# Patient Record
Sex: MALE | Race: WHITE | NOT HISPANIC OR LATINO | Employment: FULL TIME | URBAN - METROPOLITAN AREA
[De-identification: names, ages, dates, MRNs, and addresses within clinical notes are randomized per-mention and may not be internally consistent; named-entity substitution may affect disease eponyms.]

---

## 2020-01-31 ENCOUNTER — OFFICE VISIT (OUTPATIENT)
Dept: FAMILY MEDICINE CLINIC | Facility: CLINIC | Age: 45
End: 2020-01-31

## 2020-01-31 VITALS
TEMPERATURE: 98 F | SYSTOLIC BLOOD PRESSURE: 118 MMHG | HEIGHT: 75 IN | HEART RATE: 54 BPM | OXYGEN SATURATION: 98 % | BODY MASS INDEX: 24.17 KG/M2 | WEIGHT: 194.4 LBS | DIASTOLIC BLOOD PRESSURE: 80 MMHG

## 2020-01-31 DIAGNOSIS — Z00.00 HEALTHCARE MAINTENANCE: Primary | ICD-10-CM

## 2020-01-31 PROCEDURE — 99396 PREV VISIT EST AGE 40-64: CPT | Performed by: FAMILY MEDICINE

## 2020-01-31 NOTE — PROGRESS NOTES
"Stefano Stephens is here today for an annual physical exam.     Eating a healthy diet. Exercising routinely.    Sexual and gender orientation: heterosexual male  Practicing safe sex?:yes    PHQ-2 Depression Screening  Little interest or pleasure in doing things? 0   Feeling down, depressed, or hopeless? 0   PHQ-2 Total Score 0        I have reviewed the patient's medical, family, and social history in detail and updated the computerized patient record.    Screening history:  Colonoscopy - not yet due  Prostate -no symptoms  Metabolic - due    Health Maintenance   Topic Date Due   • ANNUAL PHYSICAL  06/12/1978   • INFLUENZA VACCINE  08/01/2019   • TDAP/TD VACCINES (2 - Td) 09/21/2025       Review of Systems   Constitutional: Negative for fever.   HENT: Negative for hearing loss.    Eyes: Negative for visual disturbance.   Respiratory: Negative for shortness of breath.    Cardiovascular: Negative for chest pain.   Gastrointestinal: Negative for constipation and diarrhea.   Endocrine: Negative for polyuria.   Genitourinary: Negative for difficulty urinating.   Musculoskeletal: Negative for arthralgias and myalgias.   Skin: Negative for rash.   Neurological: Negative for headaches.   Hematological: Does not bruise/bleed easily.   Psychiatric/Behavioral: Negative for dysphoric mood.       /80   Pulse 54   Temp 98 °F (36.7 °C) (Temporal)   Ht 190.5 cm (75\")   Wt 88.2 kg (194 lb 6.4 oz)   SpO2 98%   BMI 24.30 kg/m²      Physical Exam    Vital signs reviewed.  General appearance: No acute distress  Eyes: conjunctiva clear without erythema; pupils equally round and reactive  ENT: external ears and nose normal; hearing normal, oropharynx clear  Neck: supple; no thyromegaly  CV: normal rate and rhythm; no peripheral edema  Respiratory: normal respiratory effort; lungs clear to auscultation bilaterally  MSK: normal gait and station; no focal joint deformity or swelling  Skin: no rash or wounds; normal turgor  Neuro: " cranial nerves 2-12 grossly intact; normal sensation to light touch  Psych: mood and affect normal; recent and remote memory intact    No visits with results within 2 Week(s) from this visit.   Latest known visit with results is:   No results found for any previous visit.        No current outpatient medications on file.    Stefano was seen today for annual exam.    Diagnoses and all orders for this visit:    Healthcare maintenance  -     Lipid Panel  -     Comprehensive Metabolic Panel        Encourage healthy diet and exercise.  Encourage patient to stay up to date on screening examinations as indicated based on age and risk factors. F/U yearly.

## 2020-02-01 LAB
ALBUMIN SERPL-MCNC: 4.6 G/DL (ref 3.5–5.2)
ALBUMIN/GLOB SERPL: 2.6 G/DL
ALP SERPL-CCNC: 75 U/L (ref 39–117)
ALT SERPL-CCNC: 25 U/L (ref 1–41)
AST SERPL-CCNC: 22 U/L (ref 1–40)
BILIRUB SERPL-MCNC: 0.8 MG/DL (ref 0.2–1.2)
BUN SERPL-MCNC: 13 MG/DL (ref 6–20)
BUN/CREAT SERPL: 12.5 (ref 7–25)
CALCIUM SERPL-MCNC: 9.4 MG/DL (ref 8.6–10.5)
CHLORIDE SERPL-SCNC: 101 MMOL/L (ref 98–107)
CHOLEST SERPL-MCNC: 137 MG/DL (ref 0–200)
CO2 SERPL-SCNC: 30.4 MMOL/L (ref 22–29)
CREAT SERPL-MCNC: 1.04 MG/DL (ref 0.76–1.27)
GLOBULIN SER CALC-MCNC: 1.8 GM/DL
GLUCOSE SERPL-MCNC: 90 MG/DL (ref 65–99)
HDLC SERPL-MCNC: 56 MG/DL (ref 40–60)
LDLC SERPL CALC-MCNC: 63 MG/DL (ref 0–100)
POTASSIUM SERPL-SCNC: 4.5 MMOL/L (ref 3.5–5.2)
PROT SERPL-MCNC: 6.4 G/DL (ref 6–8.5)
SODIUM SERPL-SCNC: 141 MMOL/L (ref 136–145)
TRIGL SERPL-MCNC: 91 MG/DL (ref 0–150)
VLDLC SERPL CALC-MCNC: 18.2 MG/DL

## 2020-11-02 ENCOUNTER — TELEPHONE (OUTPATIENT)
Dept: FAMILY MEDICINE CLINIC | Facility: CLINIC | Age: 45
End: 2020-11-02

## 2020-11-02 NOTE — TELEPHONE ENCOUNTER
Left message to return call.  His physician results form is ready to fax, but he didn't sign it.  I have it at my desk.

## 2021-02-08 ENCOUNTER — TELEPHONE (OUTPATIENT)
Dept: FAMILY MEDICINE CLINIC | Facility: CLINIC | Age: 46
End: 2021-02-08

## 2021-02-08 NOTE — TELEPHONE ENCOUNTER
Caller: Stefano Stephens    Relationship: Self    Best call back number: [phone]    What orders are you requesting (i.e. lab or imaging): Referral for Elbow    In what timeframe would the patient need to come in: Soon    Where will you receive your lab/imaging services: No preference    Additional notes: Patient called in wanting a referral for his elbow pain. Please call patient and advise.

## 2021-02-09 DIAGNOSIS — M25.529 ARTHRALGIA OF ELBOW, UNSPECIFIED LATERALITY: Primary | ICD-10-CM

## 2021-02-15 ENCOUNTER — OFFICE VISIT (OUTPATIENT)
Dept: ORTHOPEDIC SURGERY | Facility: CLINIC | Age: 46
End: 2021-02-15

## 2021-02-15 VITALS — WEIGHT: 185 LBS | TEMPERATURE: 97.3 F | BODY MASS INDEX: 23 KG/M2 | HEIGHT: 75 IN

## 2021-02-15 DIAGNOSIS — R52 PAIN: Primary | ICD-10-CM

## 2021-02-15 DIAGNOSIS — M77.02 EPICONDYLITIS ELBOW, MEDIAL, LEFT: ICD-10-CM

## 2021-02-15 PROCEDURE — 99203 OFFICE O/P NEW LOW 30 MIN: CPT | Performed by: NURSE PRACTITIONER

## 2021-02-15 PROCEDURE — 20605 DRAIN/INJ JOINT/BURSA W/O US: CPT | Performed by: NURSE PRACTITIONER

## 2021-02-15 PROCEDURE — 73080 X-RAY EXAM OF ELBOW: CPT | Performed by: NURSE PRACTITIONER

## 2021-02-15 RX ORDER — METHYLPREDNISOLONE ACETATE 80 MG/ML
80 INJECTION, SUSPENSION INTRA-ARTICULAR; INTRALESIONAL; INTRAMUSCULAR; SOFT TISSUE
Status: COMPLETED | OUTPATIENT
Start: 2021-02-15 | End: 2021-02-15

## 2021-02-15 RX ADMIN — METHYLPREDNISOLONE ACETATE 80 MG: 80 INJECTION, SUSPENSION INTRA-ARTICULAR; INTRALESIONAL; INTRAMUSCULAR; SOFT TISSUE at 09:06

## 2021-02-15 NOTE — PATIENT INSTRUCTIONS
[facility]  Ask your health care provider which exercises are safe for you. Do exercises exactly as told by your health care provider and adjust them as directed. It is normal to feel mild stretching, pulling, tightness, or discomfort as you do these exercises. Stop right away if you feel sudden pain or your pain gets worse. Do not begin these exercises until told by your health care provider.  Stretching and range-of-motion exercises  These exercises warm up your muscles and joints and improve the movement and flexibility of your elbow. These exercises also help to relieve pain, numbness, and tingling.  Wrist flexion, assisted    1. Straighten your left / right elbow in front of you with your palm facing down toward the floor.  ? If told by your health care provider, bend your left / right elbow to a 90-degree angle (right angle) at your side.  2. With your other hand, gently push over the back of your left / right hand so your fingers point toward the floor (flexion). Stop when you feel a gentle stretch on the back of your forearm.  3. Hold this position for __________ seconds.  Repeat __________ times. Complete this exercise __________ times a day.  Wrist extension, assisted    1. Straighten your left / right elbow in front of you with your palm facing up toward the ceiling.  ? If told by your health care provider, bend your left / right elbow to a 90-degree angle (right angle) at your side.  2. With your other hand, gently pull your left / right hand and fingers toward the floor (extension). Stop when you feel a gentle stretch on the palm side of your forearm.  3. Hold this position for __________ seconds.  Repeat __________ times. Complete this exercise __________ times a day.  Assisted forearm rotation, supination  1. Sit or stand with your left / right elbow bent to a 90-degree angle (right angle) at your side.  2. Using your uninjured hand, turn (rotate) your left / right palm up toward the ceiling  (supination) until you feel a gentle stretch along the inside of your forearm.  3. Hold this position for __________ seconds.  Repeat __________ times. Complete this exercise __________ times a day.  Assisted forearm rotation, pronation  1. Sit or stand with your left / right elbow bent to a 90-degree angle (right angle) at your side.  2. Using your uninjured hand, rotate your left / right palm down toward the floor (pronation) until you feel a gentle stretch along the outside of your forearm.  3. Hold this position for __________ seconds.  Repeat __________ times. Complete this exercise __________ times a day.  Strengthening exercises  These exercises build strength and endurance in your forearm and elbow. Endurance is the ability to use your muscles for a long time, even after they get tired.  Radial deviation    1. Stand with a __________ weight or a hammer in your left / right hand. Or, sit while holding a rubber exercise band or tubing, with your left / right forearm supported on a table or countertop.  ? If you are standing, position your forearm so that your thumb is facing forward. If you are sitting, position your forearm so that the thumb is facing the ceiling. This is the neutral position.  2. Raise your hand upward in front of you so your thumb moves toward the ceiling (radial deviation), or pull up on the rubber tubing. Keep your forearm and elbow still while you move your wrist only.  3. Hold this position for __________ seconds.  4. Slowly return to the starting position.  Repeat __________ times. Complete this exercise __________ times a day.  Wrist extension, eccentric  1. Sit with your left / right forearm palm-down and supported on a table or other surface. Let your left / right wrist extend over the edge of the surface.  2. Hold a __________ weight or a piece of exercise band or tubing in your left / right hand.  ? If using a rubber exercise band or tubing, hold the other end of the tubing with  your other hand.  3. Use your uninjured hand to move your left / right hand up toward the ceiling.  4. Take your uninjured hand away and slowly return to the starting position using only your left / right hand. Lowering your arm under tension is called eccentric extension.  Repeat __________ times. Complete this exercise __________ times a day.  Wrist extension  Do not do this exercise if it causes pain at the outside of your elbow. Only do this exercise once instructed by your health care provider.  1. Sit with your left / right forearm supported on a table or other surface and your palm turned down toward the floor. Let your left / right wrist extend over the edge of the surface.  2. Hold a __________ weight or a piece of rubber exercise band or tubing.  ? If you are using a rubber exercise band or tubing, hold the band or tubing in place with your other hand to provide resistance.  3. Slowly bend your wrist so your hand moves up toward the ceiling (extension). Move only your wrist, keeping your forearm and elbow still.  4. Hold this position for __________ seconds.  5. Slowly return to the starting position.  Repeat __________ times. Complete this exercise __________ times a day.  Forearm rotation, supination  To do this exercise, you will need a lightweight hammer or rubber mallet.  1. Sit with your left / right forearm supported on a table or other surface. Bend your elbow to a 90-degree angle (right angle). Position your forearm so that your palm is facing down toward the floor, with your hand resting over the edge of the table.  2. Hold a hammer in your left / right hand.  ? To make this exercise easier, hold the hammer near the head of the hammer.  ? To make this exercise harder, hold the hammer near the end of the handle.  3. Without moving your wrist or elbow, slowly rotate your forearm so your palm faces up toward the ceiling (supination).  4. Hold this position for __________ seconds.  5. Slowly return  to the starting position.  Repeat __________ times. Complete this exercise __________ times a day.  Shoulder blade squeeze  1. Sit in a stable chair or stand with good posture. If you are sitting down, do not let your back touch the back of the chair.  2. Your arms should be at your sides with your elbows bent to a 90-degree angle (right angle). Position your forearms so that your thumbs are facing the ceiling (neutral position).  3. Without lifting your shoulders up, squeeze your shoulder blades tightly together.  4. Hold this position for __________ seconds.  5. Slowly release and return to the starting position.  Repeat __________ times. Complete this exercise __________ times a day.  This information is not intended to replace advice given to you by your health care provider. Make sure you discuss any questions you have with your health care provider.  Document Revised: 04/09/2020 Document Reviewed: 02/11/2020  Elsevier Patient Education © 2020 [employer]

## 2021-02-15 NOTE — PROGRESS NOTES
Patient Name: Stefano Stephens   YOB: 1975  Referring Primary Care Physician: Lise Yoon MD  BMI: Body mass index is 23.12 kg/m².    Chief Complaint:    Chief Complaint   Patient presents with   • Left Elbow - Pain        HPI: New patient here for left elbow pain.  No known injury or trauma.  Patient has been lifting weights and did repeated barbell and had a pain in his elbow that he noticed and is progressively gotten worse with movement.  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    Stefano Stephens is a 45 y.o. male who presents today for evaluation of   Chief Complaint   Patient presents with   • Left Elbow - Pain         Subjective   Medications:   Home Medications:  No current outpatient medications on file prior to visit.     No current facility-administered medications on file prior to visit.      Current Medications:  Scheduled Meds:  Continuous Infusions:No current facility-administered medications for this visit.     PRN Meds:.    I have reviewed the patient's medical history in detail and updated the computerized patient record.  Review and summarization of old records includes:    Past Medical History:   Diagnosis Date   • Anxiety    • Chest pain, precordial    • GERD without esophagitis    • Groin pain    • Lumbar radiculopathy    • Unilateral recurrent inguinal hernia without obstruction or gangrene    • Venereal disease screening         Past Surgical History:   Procedure Laterality Date   • INGUINAL HERNIA REPAIR          Social History     Occupational History   • Not on file   Tobacco Use   • Smoking status: Never Smoker   Substance and Sexual Activity   • Alcohol use: Not on file   • Drug use: No   • Sexual activity: Not on file      Social History     Social History Narrative   • Not on file        Family History   Problem Relation Age of Onset   • No Known Problems Other        ROS: 14 point review of systems was performed  "and all other systems were reviewed and are negative except for documented findings in HPI and today's encounter.     Allergies: No Known Allergies  Constitutional:  Denies fever, shaking or chills   Eyes:  Denies change in visual acuity   HENT:  Denies nasal congestion or sore throat   Respiratory:  Denies cough or shortness of breath   Cardiovascular:  Denies chest pain or severe LE edema   GI:  Denies abdominal pain, nausea, vomiting, bloody stools or diarrhea   Musculoskeletal:  Numbness, tingling, pain, or loss of motor function only as noted above in history of present illness.  : Denies painful urination or hematuria  Integument:  Denies rash, lesion or ulceration   Neurologic:  Denies headache or focal weakness  Endocrine:  Denies lymphadenopathy  Psych:  Denies confusion or change in mental status   Hem:  Denies active bleeding    OBJECTIVE:  Physical Exam: 45 y.o. male  Wt Readings from Last 3 Encounters:   02/15/21 83.9 kg (185 lb)   01/16/21 84.4 kg (186 lb)   12/27/20 84.4 kg (186 lb)     Ht Readings from Last 1 Encounters:   02/15/21 190.5 cm (75\")     Body mass index is 23.12 kg/m².  Vitals:    02/15/21 0838   Temp: 97.3 °F (36.3 °C)     Vital signs reviewed.     General Appearance:    Alert, cooperative, in no acute distress                  Eyes: conjunctiva clear  ENT: external ears and nose atraumatic  CV: no peripheral edema  Resp: normal respiratory effort  Skin: no rashes or wounds; normal turgor  Psych: mood and affect appropriate  Lymph: no nodes appreciated  Neuro: gross sensation intact  Vascular:  Palpable peripheral pulse in noted extremity  Musculoskeletal Extremities: Skin is warm dry and intact with good pulses movement and sensation there is no masses lymphadenopathy or break in the skin capillary refill is intact shoulders nontender wrist is nontender he is point tender over the left medial aspect of his elbow olecranon bursa no pain with supination or pronation pain is point " tender with some radiation to the top of his forearm  Radiology: X-rays were done left elbow 2 views for pain no comparison no fracture radial head is intact no anterior fat pad  No posterior fat pad      Assessment:     ICD-10-CM ICD-9-CM   1. Pain  R52 780.96   2. Epicondylitis elbow, medial, left  M77.02 726.31        Medium Joint Arthrocentesis: L olecranon bursa  Date/Time: 2/15/2021 9:06 AM  Consent given by: patient  Site marked: site marked  Timeout: Immediately prior to procedure a time out was called to verify the correct patient, procedure, equipment, support staff and site/side marked as required   Supporting Documentation  Indications: pain   Procedure Details  Location: elbow - L olecranon bursa  Preparation: Patient was prepped and draped in the usual sterile fashion  Needle gauge: 21.  Approach: anteromedial  Medications administered: 1 mL lidocaine (cardiac); 80 mg methylPREDNISolone acetate 80 MG/ML               Plan:   The diagnosis(es), natural history, pathophysiology and treatment for diagnosis(es) were discussed. Opportunity given and questions answered.  Biomechanics of pertinent body areas discussed.  When appropriate, the use of ambulatory aids discussed.    The diagnosis(es), natural history, pathophysiology and treatment for diagnosis(es) were discussed. Opportunity given and questions answered.  Biomechanics of pertinent body areas discussed.  When appropriate, the use of ambulatory aids discussed.  EXERCISES:  Advice on benefits of, and types of regular/moderate exercise pertaining to orthopedic diagnosis(es).  MEDICATIONS:  The risks, benefits, warnings,side effects and alternatives of medications discussed.  Inflammation/pain control; with cold, heat, elevation and/or liniments discussed as appropriate  Cortisone Injection. See procedure note.  HOME EXERCISE/PT program encouraged  MEDICAL RECORDS reviewed from other provider(s) for past and current medical history pertinent to this  complaint.      2/15/2021    Much of this encounter note is an electronic transcription/translation of spoken language to printed text. The electronic translation of spoken language may permit erroneous, or at times, nonsensical words or phrases to be inadvertently transcribed; Although I have reviewed the note for such errors, some may still exist

## 2021-04-06 ENCOUNTER — BULK ORDERING (OUTPATIENT)
Dept: CASE MANAGEMENT | Facility: OTHER | Age: 46
End: 2021-04-06

## 2021-04-06 DIAGNOSIS — Z23 IMMUNIZATION DUE: ICD-10-CM

## 2021-07-06 ENCOUNTER — OFFICE VISIT (OUTPATIENT)
Dept: FAMILY MEDICINE CLINIC | Facility: CLINIC | Age: 46
End: 2021-07-06

## 2021-07-06 VITALS
BODY MASS INDEX: 23.72 KG/M2 | DIASTOLIC BLOOD PRESSURE: 90 MMHG | WEIGHT: 190.8 LBS | HEIGHT: 75 IN | SYSTOLIC BLOOD PRESSURE: 130 MMHG | TEMPERATURE: 98.4 F | OXYGEN SATURATION: 97 % | HEART RATE: 66 BPM

## 2021-07-06 DIAGNOSIS — Z11.59 ENCOUNTER FOR HEPATITIS C SCREENING TEST FOR LOW RISK PATIENT: ICD-10-CM

## 2021-07-06 DIAGNOSIS — Z00.00 HEALTHCARE MAINTENANCE: Primary | ICD-10-CM

## 2021-07-06 DIAGNOSIS — E86.0 DEHYDRATION: ICD-10-CM

## 2021-07-06 PROCEDURE — 99396 PREV VISIT EST AGE 40-64: CPT | Performed by: FAMILY MEDICINE

## 2021-07-06 NOTE — PROGRESS NOTES
"Stefano Stephens is here today for an annual physical exam.     Pt also having mild headaches for weeks, intermittent, pt has been working outside and not drinking enough water. No snoring. Not waking him up at night. No incontinence.     Eating a healthy diet. Exercising routinely.    Sexual and gender orientation: heterosexual male  Practicing safe sex?:yes    PHQ-2 Depression Screening  Little interest or pleasure in doing things? 0   Feeling down, depressed, or hopeless? 0   PHQ-2 Total Score 0        I have reviewed the patient's medical, family, and social history in detail and updated the computerized patient record.    Screening history:  Colonoscopy - not yet due  Prostate -no symptoms  Metabolic - due    Health Maintenance   Topic Date Due   • COLORECTAL CANCER SCREENING  Never done   • COVID-19 Vaccine (1) Never done   • HEPATITIS C SCREENING  Never done   • ANNUAL PHYSICAL  02/01/2021   • INFLUENZA VACCINE  08/01/2021   • TDAP/TD VACCINES (2 - Td or Tdap) 09/21/2025   • Pneumococcal Vaccine 0-64  Aged Out       Review of Systems   Constitutional: Negative for fever.   HENT: Negative for hearing loss.    Eyes: Negative for visual disturbance.   Respiratory: Negative for shortness of breath.    Cardiovascular: Negative for chest pain.   Gastrointestinal: Negative for constipation and diarrhea.   Endocrine: Negative for polyuria.   Genitourinary: Negative for difficulty urinating.   Musculoskeletal: Negative for arthralgias and myalgias.   Skin: Negative for rash.   Neurological: Negative for headaches.   Hematological: Does not bruise/bleed easily.   Psychiatric/Behavioral: Negative for dysphoric mood.       /90 (BP Location: Left arm, Patient Position: Sitting)   Pulse 66   Temp 98.4 °F (36.9 °C)   Ht 190.5 cm (75\")   Wt 86.5 kg (190 lb 12.8 oz)   SpO2 97%   BMI 23.85 kg/m²      Physical Exam    Vital signs reviewed.  General appearance: No acute distress  Eyes: conjunctiva clear without " erythema; pupils equally round and reactive  ENT: external ears and nose normal; hearing normal, oropharynx clear  Neck: supple; no thyromegaly  CV: normal rate and rhythm; no peripheral edema  Respiratory: normal respiratory effort; lungs clear to auscultation bilaterally  MSK: normal gait and station; no focal joint deformity or swelling  Skin: no rash or wounds; normal turgor  Neuro: cranial nerves 2-12 grossly intact; normal sensation to light touch  Psych: mood and affect normal; recent and remote memory intact    No visits with results within 2 Week(s) from this visit.   Latest known visit with results is:   Admission on 01/16/2021, Discharged on 01/16/2021   Component Date Value Ref Range Status   • SARS-CoV-2, HILDA 01/16/2021 Detected* Not Detected Final    Comment: This nucleic acid amplification test was developed and its performance  characteristics determined by [employer]. Nucleic acid  amplification tests include PCR and TMA. This test has not been FDA  cleared or approved. This test has been authorized by FDA under an  Emergency Use Authorization (EUA). This test is only authorized for  the duration of time the declaration that circumstances exist  justifying the authorization of the emergency use of in vitro  diagnostic tests for detection of SARS-CoV-2 virus and/or diagnosis  of COVID-19 infection under section 564(b)(1) of the Act, 21 U.S.C.  360bbb-3(b) (1), unless the authorization is terminated or revoked  sooner.  When diagnostic testing is negative, the possibility of a false  negative result should be considered in the context of a patient's  recent exposures and the presence of clinical signs and symptoms  consistent with COVID-19. An individual without symptoms of COVID-19  and who is not shedding SARS-CoV-2 virus would                            expect to have a  negative (not detected) result in this assay.        No current outpatient medications on file.    Diagnoses and all  orders for this visit:    1. Healthcare maintenance (Primary)  -     CBC & Differential  -     Comprehensive Metabolic Panel  -     TSH Rfx On Abnormal To Free T4    2. Dehydration  -     CBC & Differential  -     Comprehensive Metabolic Panel  -     TSH Rfx On Abnormal To Free T4    3. Encounter for hepatitis C screening test for low risk patient  -     HCV Antibody Reflex To HILDA        Encourage healthy diet and exercise.  Encourage patient to stay up to date on screening examinations as indicated based on age and risk factors. F/U yearly.     Drink more water and f/u if symptoms do not resolve.

## 2021-07-07 LAB
ALBUMIN SERPL-MCNC: 4.6 G/DL (ref 3.5–5.2)
ALBUMIN/GLOB SERPL: 2.2 G/DL
ALP SERPL-CCNC: 81 U/L (ref 39–117)
ALT SERPL-CCNC: 22 U/L (ref 1–41)
AST SERPL-CCNC: 20 U/L (ref 1–40)
BASOPHILS # BLD AUTO: NORMAL 10*3/UL
BILIRUB SERPL-MCNC: 0.6 MG/DL (ref 0–1.2)
BUN SERPL-MCNC: 11 MG/DL (ref 6–20)
BUN/CREAT SERPL: 10.9 (ref 7–25)
CALCIUM SERPL-MCNC: 9.2 MG/DL (ref 8.6–10.5)
CHLORIDE SERPL-SCNC: 103 MMOL/L (ref 98–107)
CO2 SERPL-SCNC: 27.6 MMOL/L (ref 22–29)
CREAT SERPL-MCNC: 1.01 MG/DL (ref 0.76–1.27)
DIFFERENTIAL COMMENT: NORMAL
EOSINOPHIL # BLD AUTO: NORMAL 10*3/UL
EOSINOPHIL NFR BLD AUTO: NORMAL %
ERYTHROCYTE [DISTWIDTH] IN BLOOD BY AUTOMATED COUNT: 12.5 % (ref 12.3–15.4)
GLOBULIN SER CALC-MCNC: 2.1 GM/DL
GLUCOSE SERPL-MCNC: 94 MG/DL (ref 65–99)
HCT VFR BLD AUTO: 45.5 % (ref 37.5–51)
HCV AB S/CO SERPL IA: <0.1 S/CO RATIO (ref 0–0.9)
HCV AB SERPL QL IA: NORMAL
HGB BLD-MCNC: 15.5 G/DL (ref 13–17.7)
LYMPHOCYTES # BLD AUTO: NORMAL 10*3/UL
LYMPHOCYTES # BLD MANUAL: 1.69 10*3/MM3 (ref 0.7–3.1)
LYMPHOCYTES NFR BLD AUTO: NORMAL %
LYMPHOCYTES NFR BLD MANUAL: 31.3 % (ref 19.6–45.3)
MCH RBC QN AUTO: 32.4 PG (ref 26.6–33)
MCHC RBC AUTO-ENTMCNC: 34.1 G/DL (ref 31.5–35.7)
MCV RBC AUTO: 95.2 FL (ref 79–97)
MONOCYTES # BLD MANUAL: 0.38 10*3/MM3 (ref 0.1–0.9)
MONOCYTES NFR BLD AUTO: NORMAL %
MONOCYTES NFR BLD MANUAL: 7.1 % (ref 5–12)
NEUTROPHILS # BLD MANUAL: 3.33 10*3/MM3 (ref 1.7–7)
NEUTROPHILS NFR BLD AUTO: NORMAL %
NEUTROPHILS NFR BLD MANUAL: 61.6 % (ref 42.7–76)
PLATELET # BLD AUTO: 188 10*3/MM3 (ref 140–450)
PLATELET BLD QL SMEAR: NORMAL
POTASSIUM SERPL-SCNC: 4.7 MMOL/L (ref 3.5–5.2)
PROT SERPL-MCNC: 6.7 G/DL (ref 6–8.5)
RBC # BLD AUTO: 4.78 10*6/MM3 (ref 4.14–5.8)
RBC MORPH BLD: NORMAL
SODIUM SERPL-SCNC: 140 MMOL/L (ref 136–145)
TSH SERPL DL<=0.005 MIU/L-ACNC: 1.47 UIU/ML (ref 0.27–4.2)
WBC # BLD AUTO: 5.41 10*3/MM3 (ref 3.4–10.8)

## 2021-12-10 ENCOUNTER — OFFICE VISIT (OUTPATIENT)
Dept: FAMILY MEDICINE CLINIC | Facility: CLINIC | Age: 46
End: 2021-12-10

## 2021-12-10 VITALS
BODY MASS INDEX: 23.97 KG/M2 | HEART RATE: 92 BPM | WEIGHT: 192.8 LBS | OXYGEN SATURATION: 98 % | TEMPERATURE: 98.4 F | HEIGHT: 75 IN | DIASTOLIC BLOOD PRESSURE: 92 MMHG | SYSTOLIC BLOOD PRESSURE: 120 MMHG

## 2021-12-10 DIAGNOSIS — Z13.220 ENCOUNTER FOR LIPID SCREENING FOR CARDIOVASCULAR DISEASE: ICD-10-CM

## 2021-12-10 DIAGNOSIS — Z13.6 ENCOUNTER FOR LIPID SCREENING FOR CARDIOVASCULAR DISEASE: ICD-10-CM

## 2021-12-10 DIAGNOSIS — G57.02 PIRIFORMIS SYNDROME OF LEFT SIDE: Primary | ICD-10-CM

## 2021-12-10 PROCEDURE — 99214 OFFICE O/P EST MOD 30 MIN: CPT | Performed by: FAMILY MEDICINE

## 2021-12-10 NOTE — PROGRESS NOTES
"Amando Stephens is a 46 y.o. male.     Chief Complaint   Patient presents with   • Follow-up     [employer] screen for work        History of Present Illness   Left hip pain not improving, wanting to go to PT    Needing screening labs for work    Feels well    The following portions of the patient's history were reviewed and updated as appropriate: allergies, current medications, past family history, past medical history, past social history, past surgical history and problem list.    Past Medical History:   Diagnosis Date   • Anxiety    • Chest pain, precordial    • GERD without esophagitis    • Groin pain    • Lumbar radiculopathy    • Unilateral recurrent inguinal hernia without obstruction or gangrene    • Venereal disease screening        Past Surgical History:   Procedure Laterality Date   • INGUINAL HERNIA REPAIR         Family History   Problem Relation Age of Onset   • No Known Problems Other        Social History     Socioeconomic History   • Marital status:    Tobacco Use   • Smoking status: Never Smoker   Substance and Sexual Activity   • Drug use: No       Review of Systems   Constitutional: Negative for fever.   Respiratory: Negative for shortness of breath.        Objective   Visit Vitals  /92 (BP Location: Right arm, Patient Position: Sitting)   Pulse 92   Temp 98.4 °F (36.9 °C)   Ht 190.5 cm (75\")   Wt 87.5 kg (192 lb 12.8 oz)   SpO2 98%   BMI 24.10 kg/m²     Body mass index is 24.1 kg/m².  Physical Exam  Constitutional:       Appearance: Normal appearance. He is well-developed.   Cardiovascular:      Rate and Rhythm: Normal rate and regular rhythm.      Heart sounds: Normal heart sounds.   Pulmonary:      Effort: Pulmonary effort is normal.      Breath sounds: Normal breath sounds.   Musculoskeletal:         General: No swelling. Normal range of motion.   Skin:     General: Skin is warm and dry.      Findings: No rash.   Neurological:      General: No focal deficit " present.      Mental Status: He is alert and oriented to person, place, and time.   Psychiatric:         Mood and Affect: Mood normal.         Behavior: Behavior normal.           Assessment/Plan   Diagnoses and all orders for this visit:    1. Piriformis syndrome of left side (Primary)  -     Ambulatory Referral to Physical Therapy Evaluate and treat    2. Encounter for lipid screening for cardiovascular disease  -     Comprehensive Metabolic Panel  -     Lipid Panel        Nsaids, stretching and f/u if worse or no better. F/u as needed.

## 2021-12-11 LAB
ALBUMIN SERPL-MCNC: 4.5 G/DL (ref 4–5)
ALBUMIN/GLOB SERPL: 2.1 {RATIO} (ref 1.2–2.2)
ALP SERPL-CCNC: 77 IU/L (ref 44–121)
ALT SERPL-CCNC: 22 IU/L (ref 0–44)
AST SERPL-CCNC: 20 IU/L (ref 0–40)
BILIRUB SERPL-MCNC: 1 MG/DL (ref 0–1.2)
BUN SERPL-MCNC: 10 MG/DL (ref 6–24)
BUN/CREAT SERPL: 10 (ref 9–20)
CALCIUM SERPL-MCNC: 9.2 MG/DL (ref 8.7–10.2)
CHLORIDE SERPL-SCNC: 100 MMOL/L (ref 96–106)
CHOLEST SERPL-MCNC: 164 MG/DL (ref 100–199)
CO2 SERPL-SCNC: 24 MMOL/L (ref 20–29)
CREAT SERPL-MCNC: 1.02 MG/DL (ref 0.76–1.27)
GLOBULIN SER CALC-MCNC: 2.1 G/DL (ref 1.5–4.5)
GLUCOSE SERPL-MCNC: 84 MG/DL (ref 65–99)
HDLC SERPL-MCNC: 52 MG/DL
LDLC SERPL CALC-MCNC: 92 MG/DL (ref 0–99)
POTASSIUM SERPL-SCNC: 4.2 MMOL/L (ref 3.5–5.2)
PROT SERPL-MCNC: 6.6 G/DL (ref 6–8.5)
SODIUM SERPL-SCNC: 138 MMOL/L (ref 134–144)
TRIGL SERPL-MCNC: 108 MG/DL (ref 0–149)
VLDLC SERPL CALC-MCNC: 20 MG/DL (ref 5–40)

## 2021-12-29 ENCOUNTER — HOSPITAL ENCOUNTER (OUTPATIENT)
Dept: PHYSICAL THERAPY | Facility: HOSPITAL | Age: 46
Setting detail: THERAPIES SERIES
Discharge: HOME | End: 2021-12-29

## 2021-12-29 DIAGNOSIS — M25.552 LEFT HIP PAIN: Primary | ICD-10-CM

## 2021-12-29 DIAGNOSIS — R26.2 DIFFICULTY WALKING: ICD-10-CM

## 2021-12-29 DIAGNOSIS — M54.50 LEFT-SIDED LOW BACK PAIN WITHOUT SCIATICA, UNSPECIFIED CHRONICITY: ICD-10-CM

## 2021-12-29 PROCEDURE — 97161 PT EVAL LOW COMPLEX 20 MIN: CPT

## 2021-12-29 PROCEDURE — 97530 THERAPEUTIC ACTIVITIES: CPT

## 2021-12-29 NOTE — THERAPY EVALUATION
Outpatient Physical Therapy Ortho Initial Evaluation  [facility]     Patient Name: Stefano Stephens  : 1975  MRN: [id number]  Today's Date: 2021      Visit Date: 2021    Patient Active Problem List   Diagnosis   • Healthcare maintenance   • Dehydration   • Piriformis syndrome of left side   • Encounter for lipid screening for cardiovascular disease        Past Medical History:   Diagnosis Date   • Anxiety    • Chest pain, precordial    • GERD without esophagitis    • Groin pain    • Lumbar radiculopathy    • Unilateral recurrent inguinal hernia without obstruction or gangrene    • Venereal disease screening         Past Surgical History:   Procedure Laterality Date   • INGUINAL HERNIA REPAIR         Visit Dx:     ICD-10-CM ICD-9-CM   1. Left hip pain  M25.552 719.45   2. Left-sided low back pain without sciatica, unspecified chronicity  M54.50 724.2   3. Difficulty walking  R26.2 719.7          Patient History     Row Name 21 0900             History    Chief Complaint Pain  -RS      Type of Pain Hip pain; Back pain  -RS      Date Current Problem(s) Began 21  6 min  -RS      Brief Description of Current Complaint The pt is a 47 yo male who presets with L hip/Low back pain present about 6 months. He thinks it started when he started to lengthen his stride, he has tried to take 2-3 week breaks but that has not helped long term. He can feel pain with prolonged sitting/driving (30 tino min), Stairs, running, squatting, walking. He denies numbness or tingling. The pain goes into his back and L hip. He has tried some stretching, rest, OTC pain medication. He enjoys running, does not do other workouts.  -RS      Hand Dominance right-handed  -RS      Occupation/sports/leisure activities , running  -RS              Pain     Pain Location Hip; Back  -RS      Pain at Worst 9  -RS      Is your sleep disturbed? No  -RS              Fall Risk Assessment    Any falls in the past  year: No  -RS              Services    Are you currently receiving Home Health services No  -RS              Daily Activities    Primary Language English  -RS      Pt Participated in POC and Goals Yes  -RS              Safety    Are you being hurt, hit, or frightened by anyone at home or in your life? No  -RS      Are you being neglected by a caregiver No  -RS            User Key  (r) = Recorded By, (t) = Taken By, (c) = Cosigned By    Initials Name Provider Type    RS Priti Vilchis PT Physical Therapist                 PT Ortho     Row Name 12/29/21 1100       Special Tests/Palpation    Special Tests/Palpation Hip  -RS       Hip/Thigh Palpation    Hip/Thigh Palpation? Yes  -RS    Piriformis --  pt with TTP L obturator internus and quad fem  -RS       Hip Special Tests    ANSELMO (hip vs SI pathology) Bilateral:; Negative  -RS    FAIR test (piriformis syndrome) Bilateral:; Negative  -RS       General ROM    GENERAL ROM COMMENTS prone L hip ER/IR painfree and at least 75% of WFL  -RS       MMT (Manual Muscle Testing)    Rt Lower Ext Rt Hip Flexion; Rt Hip ABduction; Rt Hip Extension; Rt Hip Internal (Medial) Rotation; Rt Hip External (Lateral) Rotation; Rt Knee Extension; Rt Knee Flexion; Rt Ankle Plantarflexion; Rt Ankle Dorsiflexion  -RS    Lt Lower Ext Lt Hip Flexion; Lt Hip Extension; Lt Hip ABduction; Lt Hip Internal (Medial) Rotation; Lt Hip External (Lateral) Rotation; Lt Knee Extension; Lt Knee Flexion; Lt Ankle Plantarflexion; Lt Ankle Dorsiflexion  -RS       MMT Right Lower Ext    Rt Hip Flexion MMT, Gross Movement (5/5) normal  -RS    Rt Hip Extension MMT, Gross Movement (4+/5) good plus  -RS    Rt Hip ABduction MMT, Gross Movement (4+/5) good plus  -RS    Rt Hip Internal (Medial) Rotation MMT, Gross Movement (4+/5) good plus  -RS    Rt Hip External (Lateral) Rotation MMT, Gross Movement (4+/5) good plus  -RS    Rt Knee Extension MMT, Gross Movement (5/5) normal  -RS    Rt Knee Flexion MMT, Gross  "Movement (5/5) normal  -RS    Rt Ankle Plantarflexion MMT, Gross Movement (5/5) normal  -RS       MMT Left Lower Ext    Lt Hip Flexion MMT, Gross Movement (5/5) normal  -RS    Lt Hip Extension MMT, Gross Movement (4/5) good  -RS    Lt Hip ABduction MMT, Gross Movement (4/5) good  -RS    Lt Hip Internal (Medial) Rotation MMT, Gross Movement (4/5) good  -RS    Lt Hip External (Lateral) Rotation MMT, Gross Movement (4/5) good  -RS    Lt Knee Extension MMT, Gross Movement (5/5) normal  -RS    Lt Knee Flexion MMT, Gross Movement (4+/5) good plus  -RS    Lt Ankle Plantarflexion MMT, Gross Movement (5/5) normal  -RS    Lt Ankle Dorsiflexion MMT, Gross Movement (5/5) normal  -RS       Sensation    Sensation WNL? WNL  -RS       Balance Skills Training    SLS 5x single leg mini squat- pt with decreased stability on LLE compared to RLE, no pain but pt feels \"awareness\" when in L stance  -RS          User Key  (r) = Recorded By, (t) = Taken By, (c) = Cosigned By    Initials Name Provider Type    RS Priti Vilchis, PT Physical Therapist                            Therapy Education  Education Details: Access Code [email or web address] of outpatient PT, POC, differential diagnosis, initial HEP, expectations, goals, anatomy.  Given: HEP, Symptoms/condition management, Pain management  Program: New  How Provided: Demonstration, Verbal, Written  Provided to: Patient  Level of Understanding: Verbalized, Demonstrated      PT OP Goals     Row Name 12/29/21 1500          PT Short Term Goals    STG Date to Achieve 01/19/22  -RS     STG 1 The pt jenniffer report tolerance for sitting up to 20 min without increased pain to facilitate improved work performance.  -RS     STG 1 Progress New  -            Long Term Goals    LTG Date to Achieve 02/27/22  -RS     LTG 1 The pt will demonstrate IND and compliant with HEP focused on IND condition management and return to PLOF.  -RS     LTG 1 Progress New  -RS     LTG 2 The pt will score at least 70% ability " on the LEFS to indicate improved perceived performance of ADLs.  -RS     LTG 2 Progress New  -RS     LTG 3 The pt jenniffer report tolerance for sitting and driving up to 60 min without increased pain to facilitate improved work performance.  -RS     LTG 3 Progress New  -RS     LTG 4 The pt will walk 3 miles withou tinc pain to facilitate improved community navigation and recreational activity performance.  -RS     LTG 4 Progress New  -RS     LTG 5 The pt will return to running at least 2 miles without pain greater than 2/10 to facilitate return to recreational activity performance and prior PLOF.  -RS     LTG 5 Progress New  -RS            Time Calculation    PT Goal Re-Cert Due Date 03/29/22  -RS           User Key  (r) = Recorded By, (t) = Taken By, (c) = Cosigned By    Initials Name Provider Type    RS Priti Vilchis, PT Physical Therapist                 PT Assessment/Plan     Row Name 12/29/21 1300          PT Assessment    Functional Limitations Performance in work activities; Performance in sport activities; Performance in leisure activities; Limitations in community activities; Limitation in home management; Impaired gait  -RS     Impairments Balance; Gait; Endurance; Range of motion; Posture; Poor body mechanics; Pain; Muscle strength  -RS     Assessment Comments Stefano Stephens is a 46 y.o. male referred to physical therapy for L hip pain present 6 months. He presents with a stable clinical presentation, along with no remarkable comorbidities and personal factors of chronicity of pain that may impact his progress in the plan of care. Pt presents today with inc TTP L obturator internus, dec lateral and medial hip strength, dec single limb stability . his signs and symptoms are consistent with a physical therapy diagnosis of dec motor control L hip and dec pelvic stability. The previous impairments limit his ability to tolerate prolonged sitting, driving, walking, standing, and participation in recreational  activities. The pt self scores 36% ability on the LEFS (100=full ability).Pt will benefit from skilled PT to address the previous impairments and return to PLOF.  -RS     Please refer to paper survey for additional self-reported information Yes  -RS     Rehab Potential Good  -RS     Patient/caregiver participated in establishment of treatment plan and goals Yes  -RS     Patient would benefit from skilled therapy intervention Yes  -RS            PT Plan    PT Frequency 2x/week; 1x/week  -RS     Predicted Duration of Therapy Intervention (PT) 10 weeks, total of 10 sessions  -RS     Planned CPT's? PT EVAL LOW COMPLEXITY: 90994; PT RE-EVAL: 09462; PT THER PROC EA 15 MIN: 42661; PT THER ACT EA 15 MIN: 71065; PT MANUAL THERAPY EA 15 MIN: 09417; PT NEUROMUSC RE-EDUCATION EA 15 MIN: 62853; PT GAIT TRAINING EA 15 MIN: 14716; PT HOT OR COLD PACK TREAT MCARE; PT ELECTRICAL STIM UNATTEND:   -RS     PT Plan Comments Assess tolerance for initial HEP, consider figure 4 stretch, prone hip IR stretch, prone hip ER/IR with resistance or resisted clams, sideplankwith clamshell, side steps, single limb balance challenges, stool ER/IR with knee on stool, pelvic brace progression  -RS           User Key  (r) = Recorded By, (t) = Taken By, (c) = Cosigned By    Initials Name Provider Type    RS Priti Vilchis, PT Physical Therapist                   OP Exercises     Row Name 12/29/21 1100             Total Minutes    19575 - PT Therapeutic Activity Minutes 16  -RS              Exercise 1    Exercise Name 1 piriformis stretch  -RS      Cueing 1 Verbal; Demo  -RS      Sets 1 3  -RS      Reps 1 20  -RS              Exercise 2    Exercise Name 2 bridge with AD  -RS      Cueing 2 Verbal; Demo  -RS      Reps 2 10  -RS      Time 2 5  -RS      Additional Comments ball  -RS              Exercise 3    Exercise Name 3 pelvic brace in HL  -RS      Cueing 3 Verbal; Demo  -RS      Reps 3 10  -RS      Time 3 5s  -RS      Additional Comments plus  10 sec hold  -RS              Exercise 4    Exercise Name 4 clamshell and reverse clamshell  -RS      Cueing 4 Verbal; Demo  -RS      Reps 4 15 ea  -RS            User Key  (r) = Recorded By, (t) = Taken By, (c) = Cosigned By    Initials Name Provider Type    Priti Cotter, PT Physical Therapist                              Outcome Measure Options: Lower Extremity Functional Scale (LEFS)  Lower Extremity Functional Index  Any of your usual work, housework or school activities: A little bit of difficulty  Your usual hobbies, recreational or sporting activities: Quite a bit of difficulty  Getting into or out of the bath: A little bit of difficulty  Walking between rooms: A little bit of difficulty  Putting on your shoes or socks: Moderate difficulty  Squatting: Moderate difficulty  Lifting an object, like a bag of groceries from the floor: Moderate difficulty  Performing light activities around your home: Moderate difficulty  Performing heavy activities around your home: Quite a bit of difficulty  Getting into or out of a car: Quite a bit of difficulty  Walking 2 blocks: Quite a bit of difficulty  Walking a mile: Quite a bit of difficulty  Going up or down 10 stairs (about 1 flight of stairs): Quite a bit of difficulty  Standing for 1 hour: Quite a bit of difficulty  Sitting for 1 hour: Quite a bit of difficulty  Running on even ground: Quite a bit of difficulty  Running on uneven ground: Extreme difficulty or unable to perform activity  Making sharp turns while running fast: Extreme difficulty or unable to perform activity  Hopping: Quite a bit of difficulty  Rolling over in bed: Moderate difficulty  Total: 29      Time Calculation:     Start Time: 1000  Stop Time: 1038  Time Calculation (min): 38 min  Timed Charges  61231 - PT Therapeutic Activity Minutes: 16  Untimed Charges  PT Eval/Re-eval Minutes: 22  Total Minutes  Timed Charges Total Minutes: 16  Untimed Charges Total Minutes: 22   Total Minutes: 22      Therapy Charges for Today     Code Description Service Date Service Provider Modifiers Qty    98753702576 HC PT THERAPEUTIC ACT EA 15 MIN 12/29/2021 Priti Vilchis, PT GP 1    05257671098 HC PT EVAL LOW COMPLEXITY 2 12/29/2021 Priti Vilchis, PT GP 1          PT G-Codes  Outcome Measure Options: Lower Extremity Functional Scale (LEFS)  Total: 29         Priti Vilchis, PT  12/29/2021

## 2022-01-10 ENCOUNTER — HOSPITAL ENCOUNTER (OUTPATIENT)
Dept: PHYSICAL THERAPY | Facility: HOSPITAL | Age: 47
Setting detail: THERAPIES SERIES
Discharge: HOME | End: 2022-01-10

## 2022-01-10 DIAGNOSIS — R26.2 DIFFICULTY WALKING: ICD-10-CM

## 2022-01-10 DIAGNOSIS — M25.552 LEFT HIP PAIN: Primary | ICD-10-CM

## 2022-01-10 DIAGNOSIS — M54.50 LEFT-SIDED LOW BACK PAIN WITHOUT SCIATICA, UNSPECIFIED CHRONICITY: ICD-10-CM

## 2022-01-10 PROCEDURE — 97140 MANUAL THERAPY 1/> REGIONS: CPT

## 2022-01-10 PROCEDURE — 97110 THERAPEUTIC EXERCISES: CPT

## 2022-01-10 NOTE — THERAPY TREATMENT NOTE
Outpatient Physical Therapy Ortho Treatment Note  [facility]     Patient Name: Stefano Stephens  : 1975  MRN: [id number]  Today's Date: 1/10/2022      Visit Date: 01/10/2022    Visit Dx:    ICD-10-CM ICD-9-CM   1. Left hip pain  M25.552 719.45   2. Left-sided low back pain without sciatica, unspecified chronicity  M54.50 724.2   3. Difficulty walking  R26.2 719.7       Patient Active Problem List   Diagnosis   • Healthcare maintenance   • Dehydration   • Piriformis syndrome of left side   • Encounter for lipid screening for cardiovascular disease        Past Medical History:   Diagnosis Date   • Anxiety    • Chest pain, precordial    • GERD without esophagitis    • Groin pain    • Lumbar radiculopathy    • Unilateral recurrent inguinal hernia without obstruction or gangrene    • Venereal disease screening         Past Surgical History:   Procedure Laterality Date   • INGUINAL HERNIA REPAIR                          PT Assessment/Plan     Row Name 01/10/22 0800          PT Assessment    Assessment Comments Mr. Stephens returns for first follow up visit since eval, reporting minimal change in s/s despite HEP compliance. Reports his s/s are along left sided low back and lateral hip. Pt demos significant HS flexibility deficits on L side today. Continued with core/hip stability and posterior chain. Educated pt on importance of cross training as a runner, as he is a long time runner with no other strength or flexibility work in his routine. Discussed pts long term goals of running in [employer] half marathon.  -CC            PT Plan    PT Plan Comments Assess if pt trial foam rolling at home? Cont manual if beneficial. Add side steps?  -CC           User Key  (r) = Recorded By, (t) = Taken By, (c) = Cosigned By    Initials Name Provider Type    Destiny Massey, PT Physical Therapist                   OP Exercises     Row Name 01/10/22 0700             Subjective Comments    Subjective Comments Pt reports no  "change in s/s since eval. Reports he doesn't think it is a pelvic floor issues as discussed at eval as most of his s/s are in hip (indicates lateral hip) and low back. Usually occurs when sitting for 30 min or longer, but always has low level pain  -CC              Subjective Pain    Able to rate subjective pain? yes  -CC      Pre-Treatment Pain Level 3  -CC              Total Minutes    95090 - PT Therapeutic Exercise Minutes 30  -CC      01001 - PT Manual Therapy Minutes 10  -CC              Exercise 1    Exercise Name 1 bike  -CC      Cueing 1 Verbal  -CC      Time 1 5 min, lvl 1  -CC      Additional Comments seat 10  -CC              Exercise 2    Exercise Name 2 HS str step  -CC      Cueing 2 Verbal  -CC      Reps 2 3 b  -CC      Time 2 20 sec  -CC      Additional Comments reports \"very tight in back of L leg with this stretch\"  -CC              Exercise 3    Exercise Name 3 piriformis str and figure 4 str  -CC      Cueing 3 Verbal  -CC      Reps 3 3  each  -CC      Time 3 20 sec  -CC              Exercise 4    Exercise Name 4 LTR  -CC      Cueing 4 Verbal  -CC      Sets 4 1 b  -CC      Reps 4 10  -CC      Time 4 3 sec  -CC              Exercise 5    Exercise Name 5 HL TrA brace  -CC      Cueing 5 Verbal  -CC      Sets 5 1  -CC      Reps 5 10  -CC      Time 5 5 sec  -CC              Exercise 6    Exercise Name 6 bridge with AD  -CC      Cueing 6 Verbal  -CC      Sets 6 1  -CC      Reps 6 15  -CC      Time 6 3-5 sec  -CC      Additional Comments ball  -CC              Exercise 7    Exercise Name 7 clam and rev clam  -CC      Cueing 7 Verbal  -CC      Sets 7 1 b  -CC      Reps 7 15 ea  -CC      Time 7 GTB  -CC              Exercise 8    Exercise Name 8 supine sciatic n glide  -CC      Cueing 8 Verbal  -CC      Sets 8 1  -CC      Reps 8 20  -CC      Time 8 5 sec hold with DF  -CC              Exercise 9    Exercise Name 9 discussion and demo of foam rolling for piriformis and self-release with lacrosse ball  -CC   "    Cueing 9 Verbal  -CC      Time 9 5 min  -CC            User Key  (r) = Recorded By, (t) = Taken By, (c) = Cosigned By    Initials Name Provider Type    Destiny Massey, PT Physical Therapist                         Manual Rx (last 36 hours)     Manual Treatments     Row Name 01/10/22 0700             Total Minutes    89126 - PT Manual Therapy Minutes 10  -CC              Manual Rx 1    Manual Rx 1 Location L hip girdle  -CC      Manual Rx 1 Type STM with elbow and TP release  -CC      Manual Rx 1 Grade R s/l with pillow btwn knees  -CC            User Key  (r) = Recorded By, (t) = Taken By, (c) = Cosigned By    Initials Name Provider Type    CC Destiny Wei, PT Physical Therapist                 PT OP Goals     Row Name 01/10/22 0700          PT Short Term Goals    STG Date to Achieve 01/19/22  -     STG 1 The pt jenniffer report tolerance for sitting up to 20 min without increased pain to facilitate improved work performance.  -CC     STG 1 Progress Ongoing  -            Long Term Goals    LTG Date to Achieve 02/27/22  -     LTG 1 The pt will demonstrate IND and compliant with HEP focused on IND condition management and return to PLOF.  -CC     LTG 1 Progress Ongoing  -CC     LTG 2 The pt will score at least 70% ability on the LEFS to indicate improved perceived performance of ADLs.  -CC     LTG 2 Progress Ongoing  -CC     LTG 3 The pt jenniffer report tolerance for sitting and driving up to 60 min without increased pain to facilitate improved work performance.  -CC     LTG 3 Progress Ongoing  -     LTG 4 The pt will walk 3 miles withou tinc pain to facilitate improved community navigation and recreational activity performance.  -CC     LTG 4 Progress Ongoing  -CC     LTG 5 The pt will return to running at least 2 miles without pain greater than 2/10 to facilitate return to recreational activity performance and prior PLOF.  -CC     LTG 5 Progress Ongoing  -CC           User Key  (r) = Recorded By,  (t) = Taken By, (c) = Cosigned By    Initials Name Provider Type    CC Destiny Wei, PT Physical Therapist                Therapy Education  Education Details: Access Code [email or web address]  Given: HEP  Program: Reinforced, Progressed  How Provided: Demonstration, Verbal, Written  Provided to: Patient  Level of Understanding: Verbalized, Demonstrated              Time Calculation:   Start Time: 0745  Stop Time: 0825  Time Calculation (min): 40 min  Total Timed Code Minutes- PT: 40 minute(s)  Timed Charges  75295 - PT Therapeutic Exercise Minutes: 30  22591 - PT Manual Therapy Minutes: 10  Total Minutes  Timed Charges Total Minutes: 40   Total Minutes: 40  Therapy Charges for Today     Code Description Service Date Service Provider Modifiers Qty    45744018535 HC PT THER PROC EA 15 MIN 1/10/2022 Destiny Wei, PT GP 2    90059963204 HC PT MANUAL THERAPY EA 15 MIN 1/10/2022 Destiny Wei, PT GP 1                    Destiny Wei PT  1/10/2022

## 2022-01-14 ENCOUNTER — TELEPHONE (OUTPATIENT)
Dept: PHYSICAL THERAPY | Facility: HOSPITAL | Age: 47
End: 2022-01-14

## 2022-01-14 DIAGNOSIS — M25.552 LEFT HIP PAIN: Primary | ICD-10-CM

## 2022-01-14 DIAGNOSIS — M54.50 LEFT-SIDED LOW BACK PAIN WITHOUT SCIATICA, UNSPECIFIED CHRONICITY: ICD-10-CM

## 2022-01-14 DIAGNOSIS — R26.2 DIFFICULTY WALKING: ICD-10-CM

## 2022-01-14 NOTE — TELEPHONE ENCOUNTER
Left voicemail regarding no show for today's appointment. Reminded pt of next appointment date and time, as well as reminded of 24 hour cancellation policy. Requested pt call back if unable to make next appointment.

## 2022-01-17 ENCOUNTER — TELEPHONE (OUTPATIENT)
Dept: PHYSICAL THERAPY | Facility: HOSPITAL | Age: 47
End: 2022-01-17

## 2022-01-17 NOTE — TELEPHONE ENCOUNTER
Spoke with patient regarding no show for today's appointment. Reminded pt of next appointment date and time, as well as reminded of 24 hour cancellation policy. Requested pt call back if unable to make next appointment. Informed pt that if he does not show for his next appt he will be removed from the schedule.

## 2022-01-21 ENCOUNTER — HOSPITAL ENCOUNTER (OUTPATIENT)
Dept: PHYSICAL THERAPY | Facility: HOSPITAL | Age: 47
Setting detail: THERAPIES SERIES
Discharge: HOME | End: 2022-01-21

## 2022-01-21 DIAGNOSIS — R26.2 DIFFICULTY WALKING: ICD-10-CM

## 2022-01-21 DIAGNOSIS — M25.552 LEFT HIP PAIN: Primary | ICD-10-CM

## 2022-01-21 DIAGNOSIS — M54.50 LEFT-SIDED LOW BACK PAIN WITHOUT SCIATICA, UNSPECIFIED CHRONICITY: ICD-10-CM

## 2022-01-21 PROCEDURE — 97110 THERAPEUTIC EXERCISES: CPT

## 2022-01-21 PROCEDURE — 97140 MANUAL THERAPY 1/> REGIONS: CPT

## 2022-01-21 NOTE — THERAPY TREATMENT NOTE
Outpatient Physical Therapy Ortho Treatment Note  [facility]     Patient Name: Stefano Stephens  : 1975  MRN: [id number]  Today's Date: 2022      Visit Date: 2022    Visit Dx:    ICD-10-CM ICD-9-CM   1. Left hip pain  M25.552 719.45   2. Left-sided low back pain without sciatica, unspecified chronicity  M54.50 724.2   3. Difficulty walking  R26.2 719.7       Patient Active Problem List   Diagnosis   • Healthcare maintenance   • Dehydration   • Piriformis syndrome of left side   • Encounter for lipid screening for cardiovascular disease        Past Medical History:   Diagnosis Date   • Anxiety    • Chest pain, precordial    • GERD without esophagitis    • Groin pain    • Lumbar radiculopathy    • Unilateral recurrent inguinal hernia without obstruction or gangrene    • Venereal disease screening         Past Surgical History:   Procedure Laterality Date   • INGUINAL HERNIA REPAIR                          PT Assessment/Plan     Row Name 22 0800          PT Assessment    Assessment Comments Mr. Stephens returns to PT with reports of minimal change from previous session due to poor compliance with HEP and arrival rate. Educated patient on importance of attending skilled PT session consistently and performing HEP for optimal response. Provided print out of schedule. Patient continues to express L sided hip stiffness after sitting in car for extended periods of time. Reviewed supine PPT and initiated PPT in sitting and recommended patient to performing pelvic tilt when sitting for long drives. He continues to receive greatest relief following mobility/stretching interventions and deep tissue massage to L posterior hip. He remains a candidate for skilled PT.  -SHIKHA            PT Plan    PT Plan Comments How did PPT in sitting go? Cont manual if beneficial. Add side steps?  -SHIKHA           User Key  (r) = Recorded By, (t) = Taken By, (c) = Cosigned By    Initials Name Provider Type    SHIKHA Sainz  "Alma Gonzalez, PT Physical Therapist                   OP Exercises     Row Name 01/21/22 0800             Subjective Comments    Subjective Comments I have not been very active this past week due to work. I still feel this is related to my hip. When I do the stretches it seems to help  -SHIKHA              Subjective Pain    Able to rate subjective pain? yes  -SHIKHA      Pre-Treatment Pain Level 2  -SHIKHA              Total Minutes    79835 - PT Therapeutic Exercise Minutes 35  -SHIKHA      30266 - PT Manual Therapy Minutes 8  -SHIKHA              Exercise 1    Exercise Name 1 bike  -SHIKHA      Cueing 1 Verbal  -SHIKHA      Time 1 5 min, lvl 1  -SHIKHA      Additional Comments seat 10  -SHIKHA              Exercise 2    Exercise Name 2 HS str step  -SHIKHA      Cueing 2 Verbal  -SHIKHA      Reps 2 3 b  -SHIKHA      Time 2 20 sec  -SHIKHA      Additional Comments reports \"very tight in back of L leg with this stretch\"  -SHIKHA              Exercise 3    Exercise Name 3 piriformis str and figure 4 str  -SHIKHA      Cueing 3 Verbal  -SHIKHA      Reps 3 3  each  -SHIKHA      Time 3 20 sec  -SHIKHA              Exercise 4    Exercise Name 4 LTR  -SHIKHA      Cueing 4 Verbal  -SHIKHA      Sets 4 1 b  -SHIKHA      Reps 4 10  -SHIKHA      Time 4 3 sec  -SHIKHA              Exercise 5    Exercise Name 5 supine PPT  -SHIKHA      Cueing 5 Verbal  -SHIKHA      Sets 5 2  -SHIKHA      Reps 5 10  -SHIKHA      Time 5 5 sec  -SHIKHA              Exercise 6    Exercise Name 6 bridge with AD  -SHIKHA      Cueing 6 Verbal  -SHIKHA      Sets 6 1  -SHIKHA      Reps 6 15  -SHIKHA      Time 6 3-5 sec  -SHIKHA      Additional Comments ball  -SHIKHA              Exercise 7    Exercise Name 7 clam and rev clam  -SHIKHA      Cueing 7 Verbal  -SHIKHA      Sets 7 1 b  -SHIKHA      Reps 7 15 ea  -SHIKHA      Time 7 GTB  -SHIKHA      Additional Comments with TA activation  -SHIKHA              Exercise 8    Exercise Name 8 supine sciatic n glide  -SHIKHA      Cueing 8 Verbal  -SHIKHA      Sets 8 1  -SHIKHA      Reps 8 20  -SHIKHA      Time 8 5 sec hold with DF  -SHIKHA              Exercise 9    Exercise Name 9 seated " PPT  -SHIKHA      Cueing 9 Verbal; Demo  -SHIKHA      Sets 9 1  -SHIKHA      Reps 9 10  -SHIKHA      Time 9 5s  -SHIKHA              Exercise 10    Exercise Name 10 seated SB roll outs  -SHIKHA      Cueing 10 Verbal; Demo  -SHIKHA      Sets 10 1  -SHIKHA      Reps 10 5e  -SHIKHA      Time 10 5s  -SHIKHA      Additional Comments 3 way  -SHIKHA            User Key  (r) = Recorded By, (t) = Taken By, (c) = Cosigned By    Initials Name Provider Type    Alma Leigh, PT Physical Therapist                         Manual Rx (last 36 hours)     Manual Treatments     Row Name 01/21/22 0800             Total Minutes    71175 - PT Manual Therapy Minutes 8  -SHIKHA              Manual Rx 1    Manual Rx 1 Location L hip girdle  -SHIKHA      Manual Rx 1 Type STM with elbow and TP release  -SHIKHA      Manual Rx 1 Grade R s/l with pillow btwn knees  -SHIKHA            User Key  (r) = Recorded By, (t) = Taken By, (c) = Cosigned By    Initials Name Provider Type    Alma Leigh, PT Physical Therapist                 PT OP Goals     Row Name 01/21/22 0700          PT Short Term Goals    STG Date to Achieve 01/19/22  -     STG 1 The pt jenniffer report tolerance for sitting up to 20 min without increased pain to facilitate improved work performance.  -     STG 1 Progress Ongoing  -            Long Term Goals    LTG Date to Achieve 02/27/22  -     LTG 1 The pt will demonstrate IND and compliant with HEP focused on IND condition management and return to PLOF.  -     LTG 1 Progress Ongoing  -     LTG 2 The pt will score at least 70% ability on the LEFS to indicate improved perceived performance of ADLs.  -     LTG 2 Progress Ongoing  -     LTG 3 The pt jenniffer report tolerance for sitting and driving up to 60 min without increased pain to facilitate improved work performance.  -     LTG 3 Progress Ongoing  -     LTG 4 The pt will walk 3 miles withou tinc pain to facilitate improved community navigation and recreational activity performance.  -     LTG 4  Progress Ongoing  -     LTG 5 The pt will return to running at least 2 miles without pain greater than 2/10 to facilitate return to recreational activity performance and prior PLOF.  -SHIKHA     LTG 5 Progress Ongoing  -           User Key  (r) = Recorded By, (t) = Taken By, (c) = Cosigned By    Initials Name Provider Type    Alma Leigh, PT Physical Therapist                               Time Calculation:   Start Time: 0746  Stop Time: 0829  Time Calculation (min): 43 min  Timed Charges  29478 - PT Therapeutic Exercise Minutes: 35  11167 - PT Manual Therapy Minutes: 8  Total Minutes  Timed Charges Total Minutes: 43   Total Minutes: 43  Therapy Charges for Today     Code Description Service Date Service Provider Modifiers Qty    75726967596 HC PT THER PROC EA 15 MIN 1/21/2022 Alma Sainz, PT GP 2    34699964619 HC PT MANUAL THERAPY EA 15 MIN 1/21/2022 Alma Sainz, PT GP 1                    Alma Sainz PT  1/21/2022

## 2022-01-24 ENCOUNTER — APPOINTMENT (OUTPATIENT)
Dept: PHYSICAL THERAPY | Facility: HOSPITAL | Age: 47
End: 2022-01-24

## 2022-01-28 ENCOUNTER — APPOINTMENT (OUTPATIENT)
Dept: PHYSICAL THERAPY | Facility: HOSPITAL | Age: 47
End: 2022-01-28

## 2022-01-31 ENCOUNTER — TELEPHONE (OUTPATIENT)
Dept: PHYSICAL THERAPY | Facility: HOSPITAL | Age: 47
End: 2022-01-31

## 2022-01-31 NOTE — TELEPHONE ENCOUNTER
Spoke with patient regarding no show for today's appointment. Pt apologetic about missing appt, reports he meant to call yesterday to cancel, as he still has Strep Throat and a fever. Reminded pt of next appointment date and time, as well as reminded of 24 hour cancellation policy. Requested pt call back on Thursday at the latest if unable to make next appointment.

## 2022-02-04 ENCOUNTER — APPOINTMENT (OUTPATIENT)
Dept: PHYSICAL THERAPY | Facility: HOSPITAL | Age: 47
End: 2022-02-04

## 2023-01-30 ENCOUNTER — TELEPHONE (OUTPATIENT)
Dept: FAMILY MEDICINE CLINIC | Facility: CLINIC | Age: 48
End: 2023-01-30
Payer: COMMERCIAL

## 2023-01-30 NOTE — TELEPHONE ENCOUNTER
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    Please let patient know & he'll be happy to come in for the height, weight B/P portion

## 2023-06-16 ENCOUNTER — OFFICE VISIT (OUTPATIENT)
Dept: FAMILY MEDICINE CLINIC | Facility: CLINIC | Age: 48
End: 2023-06-16
Payer: COMMERCIAL

## 2023-06-16 VITALS
SYSTOLIC BLOOD PRESSURE: 124 MMHG | OXYGEN SATURATION: 98 % | TEMPERATURE: 97.8 F | DIASTOLIC BLOOD PRESSURE: 88 MMHG | HEART RATE: 78 BPM | WEIGHT: 201.8 LBS | BODY MASS INDEX: 25.09 KG/M2 | HEIGHT: 75 IN

## 2023-06-16 DIAGNOSIS — Z13.6 ENCOUNTER FOR LIPID SCREENING FOR CARDIOVASCULAR DISEASE: ICD-10-CM

## 2023-06-16 DIAGNOSIS — Z12.11 COLON CANCER SCREENING: ICD-10-CM

## 2023-06-16 DIAGNOSIS — Z00.00 HEALTHCARE MAINTENANCE: Primary | ICD-10-CM

## 2023-06-16 DIAGNOSIS — Z13.220 ENCOUNTER FOR LIPID SCREENING FOR CARDIOVASCULAR DISEASE: ICD-10-CM

## 2023-06-16 PROCEDURE — 99396 PREV VISIT EST AGE 40-64: CPT | Performed by: FAMILY MEDICINE

## 2023-06-16 NOTE — PROGRESS NOTES
"Stefano Stephens is here today for an annual physical exam.       Eating a healthy diet. Exercising routinely.    Sexual and gender orientation: heterosexual male  Practicing safe sex?:yes    PHQ-2 Depression Screening  Little interest or pleasure in doing things?  0   Feeling down, depressed, or hopeless?  0   PHQ-2 Total Score  0        I have reviewed the patient's medical, family, and social history in detail and updated the computerized patient record.    Screening history:  Colonoscopy - due  Prostate -no symptoms  Metabolic - due    Health Maintenance   Topic Date Due    COLORECTAL CANCER SCREENING  Never done    COVID-19 Vaccine (1) Never done    ANNUAL PHYSICAL  07/06/2022    INFLUENZA VACCINE  10/01/2023    TDAP/TD VACCINES (2 - Td or Tdap) 09/21/2025    HEPATITIS C SCREENING  Completed    Pneumococcal Vaccine 0-64  Aged Out       Review of Systems   Constitutional:  Negative for fever.   HENT:  Negative for hearing loss.    Eyes:  Negative for visual disturbance.   Respiratory:  Negative for shortness of breath.    Cardiovascular:  Negative for chest pain.   Gastrointestinal:  Negative for constipation and diarrhea.   Endocrine: Negative for polyuria.   Genitourinary:  Negative for difficulty urinating.   Musculoskeletal:  Negative for arthralgias and myalgias.   Skin:  Negative for rash.   Neurological:  Negative for headaches.   Hematological:  Does not bruise/bleed easily.   Psychiatric/Behavioral:  Negative for dysphoric mood.      /88 (BP Location: Left arm, Patient Position: Sitting)   Pulse 78   Temp 97.8 °F (36.6 °C)   Ht 190.5 cm (75\")   Wt 91.5 kg (201 lb 12.8 oz)   SpO2 98%   BMI 25.22 kg/m²      Physical Exam    Vital signs reviewed.  General appearance: No acute distress  Eyes: conjunctiva clear without erythema; pupils equally round and reactive  ENT: external ears and nose normal; hearing normal, oropharynx clear  Neck: supple; no thyromegaly  CV: normal rate and rhythm; no " peripheral edema  Respiratory: normal respiratory effort; lungs clear to auscultation bilaterally  MSK: normal gait and station; no focal joint deformity or swelling  Skin: no rash or wounds; normal turgor  Neuro: cranial nerves 2-12 grossly intact; normal sensation to light touch  Psych: mood and affect normal; recent and remote memory intact    No visits with results within 2 Week(s) from this visit.   Latest known visit with results is:   Admission on 09/09/2022, Discharged on 09/09/2022   Component Date Value Ref Range Status    Rapid Strep A Screen 09/09/2022 Negative  Negative, VALID, INVALID, Not Performed Final    Internal Control 09/09/2022 Passed  Passed Final    Lot Number 09/09/2022 [id number]   Final    Expiration Date 09/09/2022 [id number]   Final        No current outpatient medications on file.    Diagnoses and all orders for this visit:    1. Healthcare maintenance (Primary)    2. Encounter for lipid screening for cardiovascular disease  -     Comprehensive Metabolic Panel  -     Lipid Panel    3. Colon cancer screening  -     Cologuard - Stool, Per Rectum; Future        Encourage healthy diet and exercise.  Encourage patient to stay up to date on screening examinations as indicated based on age and risk factors. F/U yearly.

## 2023-06-17 LAB
ALBUMIN SERPL-MCNC: 4.4 G/DL (ref 3.5–5.2)
ALBUMIN/GLOB SERPL: 2.2 G/DL
ALP SERPL-CCNC: 96 U/L (ref 39–117)
ALT SERPL-CCNC: 18 U/L (ref 1–41)
AST SERPL-CCNC: 14 U/L (ref 1–40)
BILIRUB SERPL-MCNC: 0.8 MG/DL (ref 0–1.2)
BUN SERPL-MCNC: 17 MG/DL (ref 6–20)
BUN/CREAT SERPL: 16.2 (ref 7–25)
CALCIUM SERPL-MCNC: 9.2 MG/DL (ref 8.6–10.5)
CHLORIDE SERPL-SCNC: 102 MMOL/L (ref 98–107)
CHOLEST SERPL-MCNC: 158 MG/DL (ref 0–200)
CO2 SERPL-SCNC: 25.5 MMOL/L (ref 22–29)
CREAT SERPL-MCNC: 1.05 MG/DL (ref 0.76–1.27)
EGFRCR SERPLBLD CKD-EPI 2021: 87.6 ML/MIN/1.73
GLOBULIN SER CALC-MCNC: 2 GM/DL
GLUCOSE SERPL-MCNC: 85 MG/DL (ref 65–99)
HDLC SERPL-MCNC: 62 MG/DL (ref 40–60)
LDLC SERPL CALC-MCNC: 78 MG/DL (ref 0–100)
POTASSIUM SERPL-SCNC: 4 MMOL/L (ref 3.5–5.2)
PROT SERPL-MCNC: 6.4 G/DL (ref 6–8.5)
SODIUM SERPL-SCNC: 139 MMOL/L (ref 136–145)
TRIGL SERPL-MCNC: 102 MG/DL (ref 0–150)
VLDLC SERPL CALC-MCNC: 18 MG/DL (ref 5–40)

## 2023-08-11 ENCOUNTER — OFFICE VISIT (OUTPATIENT)
Dept: SPORTS MEDICINE | Facility: CLINIC | Age: 48
End: 2023-08-11
Payer: COMMERCIAL

## 2023-08-11 VITALS
WEIGHT: 201 LBS | BODY MASS INDEX: 24.99 KG/M2 | OXYGEN SATURATION: 99 % | TEMPERATURE: 98.4 F | RESPIRATION RATE: 16 BRPM | HEART RATE: 76 BPM | SYSTOLIC BLOOD PRESSURE: 126 MMHG | HEIGHT: 75 IN | DIASTOLIC BLOOD PRESSURE: 82 MMHG

## 2023-08-11 DIAGNOSIS — M25.512 ARTHRALGIA OF LEFT ACROMIOCLAVICULAR JOINT: ICD-10-CM

## 2023-08-11 DIAGNOSIS — M25.512 CHRONIC LEFT SHOULDER PAIN: Primary | ICD-10-CM

## 2023-08-11 DIAGNOSIS — G89.29 CHRONIC LEFT SHOULDER PAIN: Primary | ICD-10-CM

## 2023-08-11 RX ORDER — TRIAMCINOLONE ACETONIDE 40 MG/ML
20 INJECTION, SUSPENSION INTRA-ARTICULAR; INTRAMUSCULAR
Status: DISCONTINUED | OUTPATIENT
Start: 2023-08-11 | End: 2023-08-11 | Stop reason: HOSPADM

## 2023-08-11 RX ADMIN — TRIAMCINOLONE ACETONIDE 20 MG: 40 INJECTION, SUSPENSION INTRA-ARTICULAR; INTRAMUSCULAR at 11:02

## 2023-08-11 NOTE — PROGRESS NOTES
"Stefano is a 48 y.o. year old male presents to [facility]    Chief Complaint   Patient presents with    Shoulder Pain     Left shoulder pain for 5 months. Had visit to [facility], prescribed otc nsiad with little to no help for pain.         History of Present Illness  New patient, new problem.  Symptom onset approximately 5 months ago.  He states at the time of his symptoms beginning, he was swinging dumbbells while walking on treadmill.  Since, has had focal pain along the top and front of his shoulder.  He notices motions for which he reaches cross body and overhead exacerbate his symptoms.  Also endorses nighttime pain.  Was seen at outside facility, prescribed anti-inflammatory with no relief.  Has played soccer his entire life.  Does not associate any significant trauma to the shoulder, denies clavicle fracture nor AC separation.    I have reviewed the patient's medical, family, and social history in detail and updated the computerized patient record.    /82 (BP Location: Right arm, Patient Position: Sitting, Cuff Size: Adult)   Pulse 76   Temp 98.4 øF (36.9 øC)   Resp 16   Ht 190.5 cm (75\")   Wt 91.2 kg (201 lb)   SpO2 99%   BMI 25.12 kg/mý      Physical Exam    Vital signs reviewed.   General: No acute distress.  Eyes: conjunctiva clear; pupils equally round and reactive  ENT: external ears atraumatic  CV: no peripheral edema  Resp: normal respiratory effort, no use of accessory muscles  Skin: no rashes or wounds; normal turgor  Psych: mood and affect appropriate; recent and remote memory intact  Neuro: sensation to light touch intact    MSK Exam  Shoulder: Full range of motion.  Negative drop arm.  Negative to can.  Positive Fowler, positive scarf sign.  Negative apprehension test.  There is focal area of tenderness along the acromioclavicular joint.    Left Shoulder X-Ray  Indication: Pain  Views: AP internal and external    Findings:  No fracture  No " bony lesion  Normal soft tissues  Moderate AC DJD    No prior studies were available for comparison.        - Medium Joint Arthrocentesis: L acromioclavicular on 8/11/2023 11:02 AM  Indications: pain  Details: 25 G needle, ultrasound-guided lateral approach  Medications: 20 mg triamcinolone acetonide 40 MG/ML  Outcome: tolerated well, no immediate complications  Procedure, treatment alternatives, risks and benefits explained, specific risks discussed. Consent was given by the patient. Immediately prior to procedure a time out was called to verify the correct patient, procedure, equipment, support staff and site/side marked as required. Patient was prepped and draped in the usual sterile fashion.         Diagnoses and all orders for this visit:    Chronic left shoulder pain  -     XR Shoulder 2+ View Left; Future  -     XR Shoulder 2+ View Left    Arthralgia of left acromioclavicular joint  -     - Medium Joint Arthrocentesis      Discussed exam and x-ray findings with patient.  Symptoms consistent with acromioclavicular arthralgia, acute exacerbation.  Discussed medication options such as topical cream, NSAIDs.  Injection as documented above.  Activity as tolerated.      Follow Up   No follow-ups on file.  Patient was given instructions and counseling regarding his condition or for health maintenance advice. Please see specific information pulled into the AVS if appropriate.     EMR Dragon/Transcription disclaimer:    Much of this encounter note is an electronic transcription/translation of spoken language to printed text.  The electronic translation of spoken language may permit erroneous, or at times, nonsensical words or phrases to be inadvertently transcribed.  Although I have reviewed the note for such errors some may still exist.

## 2024-01-15 ENCOUNTER — OFFICE VISIT (OUTPATIENT)
Dept: SPORTS MEDICINE | Facility: CLINIC | Age: 49
End: 2024-01-15
Payer: COMMERCIAL

## 2024-01-15 VITALS
SYSTOLIC BLOOD PRESSURE: 112 MMHG | HEART RATE: 53 BPM | HEIGHT: 75 IN | OXYGEN SATURATION: 98 % | BODY MASS INDEX: 23.87 KG/M2 | RESPIRATION RATE: 16 BRPM | WEIGHT: 192 LBS | DIASTOLIC BLOOD PRESSURE: 70 MMHG

## 2024-01-15 DIAGNOSIS — M25.512 CHRONIC LEFT SHOULDER PAIN: ICD-10-CM

## 2024-01-15 DIAGNOSIS — G89.29 CHRONIC LEFT SHOULDER PAIN: ICD-10-CM

## 2024-01-15 DIAGNOSIS — M25.512 ARTHRALGIA OF LEFT ACROMIOCLAVICULAR JOINT: Primary | ICD-10-CM

## 2024-01-15 PROCEDURE — 99213 OFFICE O/P EST LOW 20 MIN: CPT | Performed by: FAMILY MEDICINE

## 2024-01-15 PROCEDURE — 20606 DRAIN/INJ JOINT/BURSA W/US: CPT | Performed by: FAMILY MEDICINE

## 2024-01-15 RX ORDER — TRIAMCINOLONE ACETONIDE 40 MG/ML
20 INJECTION, SUSPENSION INTRA-ARTICULAR; INTRAMUSCULAR
Status: DISCONTINUED | OUTPATIENT
Start: 2024-01-15 | End: 2024-01-15 | Stop reason: HOSPADM

## 2024-01-15 RX ADMIN — TRIAMCINOLONE ACETONIDE 20 MG: 40 INJECTION, SUSPENSION INTRA-ARTICULAR; INTRAMUSCULAR at 16:00

## 2024-01-15 NOTE — PROGRESS NOTES
"Stefano is a 48 y.o. year old male presents to [facility]    Chief Complaint   Patient presents with    Left Shoulder - Follow-up, Pain     F/u eval for LT shoulder pain with AC arthralgia - here for consideration of repeat steroid injection to the AC joint - same sxs as last time - here for further evaluation and treatment        History of Present Illness  Acute exacerbation left shoulder pain approximately 3 weeks ago.  History of same which nears complete resolution occurred after AC joint injection in August.  He states that his symptoms recurred gradually while he was playing a game with his children around [place] time.  He has since noticed pain when applying seatbelt, donning and doffing clothing.  Would like to discuss options further.    I have reviewed the patient's medical, family, and social history in detail and updated the computerized patient record.    /70 (BP Location: Right arm, Patient Position: Sitting, Cuff Size: Large Adult)   Pulse 53   Resp 16   Ht 190.5 cm (75\")   Wt 87.1 kg (192 lb)   SpO2 98%   BMI 24.00 kg/m²      Physical Exam    Vital signs reviewed.   General: No acute distress.  Eyes: conjunctiva clear; pupils equally round and reactive  ENT: external ears atraumatic  CV: no peripheral edema  Resp: normal respiratory effort, no use of accessory muscles  Skin: no rashes or wounds; normal turgor  Psych: mood and affect appropriate; recent and remote memory intact  Neuro: sensation to light touch intact    MSK Exam  L shoulder: Negative drop arm.  Positive scarf sign.    XR Shoulder 2+ View Left (08/11/2023 10:25)  Reviewed independently.  Moderate AC OA          - Medium Joint Arthrocentesis: L acromioclavicular on 1/15/2024 4:00 PM  Indications: pain  Details: 22 G needle, ultrasound-guided anterolateral approach  Medications: 20 mg triamcinolone acetonide 40 MG/ML  Outcome: tolerated well, no immediate complications  Procedure, " treatment alternatives, risks and benefits explained, specific risks discussed. Consent was given by the patient. Immediately prior to procedure a time out was called to verify the correct patient, procedure, equipment, support staff and site/side marked as required. Patient was prepped and draped in the usual sterile fashion.           Diagnoses and all orders for this visit:    Arthralgia of left acromioclavicular joint  -     - Medium Joint Arthrocentesis    Chronic left shoulder pain      Discussed continued nonsurgical versus surgical management of AC OA.  He has responded favorably to injection in the past.  He would like to continue with this management route though he may consider surgery for AC decompression in the future.    Total time: 20 minutes. This includes time spent with the patient, but also time spent before the visit reviewing the chart and time after the visit documenting the visit, reviewing labs, imaging studies, etc. This is in addition to/separate from any documented procedures performed.           Follow Up     No follow-ups on file.    Patient was given instructions and counseling regarding his condition or for health maintenance advice. Please see specific information pulled into the AVS if appropriate.     EMR Dragon/Transcription disclaimer:    Much of this encounter note is an electronic transcription/translation of spoken language to printed text.  The electronic translation of spoken language may permit erroneous, or at times, nonsensical words or phrases to be inadvertently transcribed.  Although I have reviewed the note for such errors some may still exist.

## 2024-07-24 ENCOUNTER — OFFICE VISIT (OUTPATIENT)
Dept: FAMILY MEDICINE CLINIC | Facility: CLINIC | Age: 49
End: 2024-07-24

## 2024-07-24 VITALS
BODY MASS INDEX: 25.39 KG/M2 | HEART RATE: 54 BPM | DIASTOLIC BLOOD PRESSURE: 80 MMHG | SYSTOLIC BLOOD PRESSURE: 124 MMHG | WEIGHT: 204.2 LBS | HEIGHT: 75 IN | OXYGEN SATURATION: 98 % | TEMPERATURE: 97.3 F

## 2024-07-24 DIAGNOSIS — Z13.6 ENCOUNTER FOR LIPID SCREENING FOR CARDIOVASCULAR DISEASE: ICD-10-CM

## 2024-07-24 DIAGNOSIS — Z13.220 ENCOUNTER FOR LIPID SCREENING FOR CARDIOVASCULAR DISEASE: ICD-10-CM

## 2024-07-24 DIAGNOSIS — Z00.00 HEALTHCARE MAINTENANCE: Primary | ICD-10-CM

## 2024-07-24 PROCEDURE — 99396 PREV VISIT EST AGE 40-64: CPT | Performed by: FAMILY MEDICINE

## 2024-07-24 NOTE — PROGRESS NOTES
"Stefano Stephens is here today for an annual physical exam.     Eating a healthy diet. Exercising routinely.    Practicing safe sex?: yes    PHQ-2 Depression Screening  Little interest or pleasure in doing things?  0   Feeling down, depressed, or hopeless?  0   PHQ-2 Total Score  0        I have reviewed the patient's medical, family, and social history in detail and updated the computerized patient record.    Screening history:  Colonoscopy - utd  Prostate - no symptoms  Metabolic - due    Health Maintenance   Topic Date Due    BMI FOLLOWUP  12/10/2022    COVID-19 Vaccine (1 - 2023-24 season) Never done    ANNUAL PHYSICAL  06/16/2024    INFLUENZA VACCINE  08/01/2024    TDAP/TD VACCINES (2 - Td or Tdap) 09/21/2025    COLORECTAL CANCER SCREENING  06/23/2026    HEPATITIS C SCREENING  Completed    Pneumococcal Vaccine 0-64  Aged Out       Review of Systems   Constitutional:  Negative for fever.   HENT:  Negative for hearing loss.    Eyes:  Negative for visual disturbance.   Respiratory:  Negative for shortness of breath.    Cardiovascular:  Negative for chest pain.   Gastrointestinal:  Negative for constipation and diarrhea.   Endocrine: Negative for polyuria.   Genitourinary:  Negative for difficulty urinating.   Musculoskeletal:  Negative for arthralgias and myalgias.   Skin:  Negative for rash.   Neurological:  Negative for headaches.   Hematological:  Does not bruise/bleed easily.   Psychiatric/Behavioral:  Negative for dysphoric mood.        /80 (BP Location: Left arm, Patient Position: Sitting, Cuff Size: Adult)   Pulse 54   Temp 97.3 °F (36.3 °C)   Ht 190.5 cm (75\")   Wt 92.6 kg (204 lb 3.2 oz)   SpO2 98%   BMI 25.52 kg/m²      Physical Exam    Vital signs reviewed.  General appearance: No acute distress  Eyes: conjunctiva clear without erythema; pupils equally round and reactive  ENT: external ears and nose normal; hearing normal, oropharynx clear  Neck: supple; no thyromegaly  CV: normal rate and " rhythm; no peripheral edema  Respiratory: normal respiratory effort; lungs clear to auscultation bilaterally  MSK: normal gait and station; no focal joint deformity or swelling  Skin: no rash or wounds; normal turgor  Neuro: cranial nerves 2-12 grossly intact; normal sensation to light touch  Psych: mood and affect normal; recent and remote memory intact    No visits with results within 2 Week(s) from this visit.   Latest known visit with results is:   Admission on 05/28/2024, Discharged on 05/28/2024   Component Date Value Ref Range Status    Rapid Strep A Screen 05/28/2024 Negative   Final    Internal Control 05/28/2024 Passed   Final    Lot Number 05/28/2024 [id number]   Final    Expiration Date 05/28/[id number]   Final        No current outpatient medications on file.    Diagnoses and all orders for this visit:    1. Healthcare maintenance (Primary)    2. Encounter for lipid screening for cardiovascular disease  -     Comprehensive Metabolic Panel  -     Lipid Panel        Encourage healthy diet and exercise.  Encourage patient to stay up to date on screening examinations as indicated based on age and risk factors. F/U yearly.

## 2024-07-25 LAB
ALBUMIN SERPL-MCNC: 4.3 G/DL (ref 4.1–5.1)
ALP SERPL-CCNC: 88 IU/L (ref 44–121)
ALT SERPL-CCNC: 25 IU/L (ref 0–44)
AST SERPL-CCNC: 19 IU/L (ref 0–40)
BILIRUB SERPL-MCNC: 0.7 MG/DL (ref 0–1.2)
BUN SERPL-MCNC: 13 MG/DL (ref 6–24)
BUN/CREAT SERPL: 12 (ref 9–20)
CALCIUM SERPL-MCNC: 9.2 MG/DL (ref 8.7–10.2)
CHLORIDE SERPL-SCNC: 100 MMOL/L (ref 96–106)
CHOLEST SERPL-MCNC: 157 MG/DL (ref 100–199)
CO2 SERPL-SCNC: 24 MMOL/L (ref 20–29)
CREAT SERPL-MCNC: 1.1 MG/DL (ref 0.76–1.27)
EGFRCR SERPLBLD CKD-EPI 2021: 82 ML/MIN/1.73
GLOBULIN SER CALC-MCNC: 2.3 G/DL (ref 1.5–4.5)
GLUCOSE SERPL-MCNC: 85 MG/DL (ref 70–99)
HDLC SERPL-MCNC: 48 MG/DL
LDLC SERPL CALC-MCNC: 95 MG/DL (ref 0–99)
POTASSIUM SERPL-SCNC: 4.1 MMOL/L (ref 3.5–5.2)
PROT SERPL-MCNC: 6.6 G/DL (ref 6–8.5)
SODIUM SERPL-SCNC: 136 MMOL/L (ref 134–144)
TRIGL SERPL-MCNC: 72 MG/DL (ref 0–149)
VLDLC SERPL CALC-MCNC: 14 MG/DL (ref 5–40)

## 2025-04-29 PROBLEM — M25.512 PAIN IN JOINT OF LEFT SHOULDER: Status: ACTIVE | Noted: 2023-05-31

## 2025-06-20 ENCOUNTER — OFFICE VISIT (OUTPATIENT)
Dept: FAMILY MEDICINE CLINIC | Facility: CLINIC | Age: 50
End: 2025-06-20
Payer: COMMERCIAL

## 2025-06-20 VITALS
OXYGEN SATURATION: 98 % | BODY MASS INDEX: 24.62 KG/M2 | SYSTOLIC BLOOD PRESSURE: 132 MMHG | WEIGHT: 198 LBS | HEIGHT: 75 IN | DIASTOLIC BLOOD PRESSURE: 78 MMHG | HEART RATE: 50 BPM | TEMPERATURE: 97.7 F

## 2025-06-20 DIAGNOSIS — Z13.6 SCREENING FOR CARDIOVASCULAR CONDITION: ICD-10-CM

## 2025-06-20 DIAGNOSIS — H69.91 DYSFUNCTION OF RIGHT EUSTACHIAN TUBE: Primary | ICD-10-CM

## 2025-06-20 PROCEDURE — 99214 OFFICE O/P EST MOD 30 MIN: CPT | Performed by: FAMILY MEDICINE

## 2025-06-20 NOTE — PROGRESS NOTES
"Subjective   Stefano Stephens is a 50 y.o. male.     Chief Complaint   Patient presents with    Earache     Would like bloodwork       History of Present Illness          Underwater sound in right ear, comes and goes. Has not had an issue in a week. Has had issue for 6-7 months.     Pt's father just had an MI. He is wanting some screenings.     The following portions of the patient's history were reviewed and updated as appropriate: allergies, current medications, past family history, past medical history, past social history, past surgical history and problem list.    Past Medical History:   Diagnosis Date    Anxiety     Chest pain, precordial     GERD without esophagitis     Groin pain     Lumbar radiculopathy     Unilateral recurrent inguinal hernia without obstruction or gangrene     Venereal disease screening        Past Surgical History:   Procedure Laterality Date    HERNIA REPAIR      INGUINAL HERNIA REPAIR         Family History   Problem Relation Age of Onset    No Known Problems Other        Social History     Socioeconomic History    Marital status:    Tobacco Use    Smoking status: Never   Vaping Use    Vaping status: Never Used   Substance and Sexual Activity    Alcohol use: Yes     Alcohol/week: 6.0 standard drinks of alcohol     Types: 6 Cans of beer per week    Drug use: No    Sexual activity: Yes     Partners: Female     Birth control/protection: Vasectomy       Review of Systems   Respiratory:  Negative for shortness of breath.    Cardiovascular:  Negative for chest pain.       Objective   Visit Vitals  /78 (BP Location: Left arm, Patient Position: Sitting, Cuff Size: Adult)   Pulse 50   Temp 97.7 °F (36.5 °C)   Ht 190.5 cm (75\")   Wt 89.8 kg (198 lb)   SpO2 98%   BMI 24.75 kg/m²     Body mass index is 24.75 kg/m².  Physical Exam  Constitutional:       Appearance: Normal appearance. He is well-developed.   HENT:      Ears:      Comments: Bilat tm bulging with fluid bubbles. "   Cardiovascular:      Rate and Rhythm: Normal rate and regular rhythm.      Heart sounds: Normal heart sounds.   Pulmonary:      Effort: Pulmonary effort is normal.      Breath sounds: Normal breath sounds.   Musculoskeletal:         General: No swelling. Normal range of motion.   Skin:     General: Skin is warm and dry.      Findings: No rash.   Neurological:      General: No focal deficit present.      Mental Status: He is alert and oriented to person, place, and time.   Psychiatric:         Mood and Affect: Mood normal.         Behavior: Behavior normal.           Assessment & Plan   Diagnoses and all orders for this visit:    1. Dysfunction of right eustachian tube (Primary)    2. Screening for cardiovascular condition  -     Comprehensive Metabolic Panel  -     Lipid Panel  -     CT Cardiac Calcium Score Without Dye; Future          Discussed flonase and f/u if worse or no better.

## 2025-06-21 LAB
ALBUMIN SERPL-MCNC: 4.4 G/DL (ref 3.5–5.2)
ALBUMIN/GLOB SERPL: 1.7 G/DL
ALP SERPL-CCNC: 88 U/L (ref 39–117)
ALT SERPL-CCNC: 30 U/L (ref 1–41)
AST SERPL-CCNC: 16 U/L (ref 1–40)
BILIRUB SERPL-MCNC: 0.8 MG/DL (ref 0–1.2)
BUN SERPL-MCNC: 13 MG/DL (ref 6–20)
BUN/CREAT SERPL: 14.8 (ref 7–25)
CALCIUM SERPL-MCNC: 9.2 MG/DL (ref 8.6–10.5)
CHLORIDE SERPL-SCNC: 102 MMOL/L (ref 98–107)
CHOLEST SERPL-MCNC: 162 MG/DL (ref 0–200)
CO2 SERPL-SCNC: 25.2 MMOL/L (ref 22–29)
CREAT SERPL-MCNC: 0.88 MG/DL (ref 0.76–1.27)
EGFRCR SERPLBLD CKD-EPI 2021: 104.8 ML/MIN/1.73
GLOBULIN SER CALC-MCNC: 2.6 GM/DL
GLUCOSE SERPL-MCNC: 86 MG/DL (ref 65–99)
HDLC SERPL-MCNC: 49 MG/DL (ref 40–60)
LDLC SERPL CALC-MCNC: 97 MG/DL (ref 0–100)
POTASSIUM SERPL-SCNC: 4.2 MMOL/L (ref 3.5–5.2)
PROT SERPL-MCNC: 7 G/DL (ref 6–8.5)
SODIUM SERPL-SCNC: 138 MMOL/L (ref 136–145)
TRIGL SERPL-MCNC: 84 MG/DL (ref 0–150)
VLDLC SERPL CALC-MCNC: 16 MG/DL (ref 5–40)

## 2025-06-23 ENCOUNTER — RESULTS FOLLOW-UP (OUTPATIENT)
Dept: FAMILY MEDICINE CLINIC | Facility: CLINIC | Age: 50
End: 2025-06-23
Payer: COMMERCIAL

## 2025-07-11 ENCOUNTER — HOSPITAL ENCOUNTER (OUTPATIENT)
Dept: CT IMAGING | Facility: HOSPITAL | Age: 50
Discharge: HOME | End: 2025-07-11
Admitting: FAMILY MEDICINE

## 2025-07-11 DIAGNOSIS — Z13.6 SCREENING FOR CARDIOVASCULAR CONDITION: ICD-10-CM

## 2025-07-11 PROCEDURE — 75571 CT HRT W/O DYE W/CA TEST: CPT
